# Patient Record
(demographics unavailable — no encounter records)

---

## 2025-07-07 NOTE — HISTORY OF PRESENT ILLNESS
[de-identified] : post sequential total thyroidectomy for compressive goiter, 2002 = left johanna for substernal goiter 2018 = right johanna for recurrence on contralateral side maintained on Levothyroxine 150 mcg 6//25 labs:\TSH= 0.51 T4 free= 1.4 T4 total= 9.9

## 2025-07-07 NOTE — ASSESSMENT
[FreeTextEntry1] : on review of recent lab results, patient clinically and chemically euthyroid, Will maintain on current dose, 150 mcg replacement, return in 1 year with blood draw 2 weeks prior, script given,. Renewed prescription, Proposed plan of management including possible treatment alternatives, pros & cons, and risks/benefits ratio discussed fully with patient and all questions answered to patient's satisfaction.